# Patient Record
Sex: FEMALE | Race: WHITE | NOT HISPANIC OR LATINO | URBAN - METROPOLITAN AREA
[De-identification: names, ages, dates, MRNs, and addresses within clinical notes are randomized per-mention and may not be internally consistent; named-entity substitution may affect disease eponyms.]

---

## 2024-05-28 ENCOUNTER — TELEPHONE (OUTPATIENT)
Dept: PAIN MEDICINE | Facility: CLINIC | Age: 44
End: 2024-05-28
Payer: MEDICAID

## 2024-10-07 NOTE — TELEPHONE ENCOUNTER
----- Message from Danika Garza sent at 5/28/2024  9:50 AM CDT -----  States Dr Cayden Romero sent a referral. She is having lower back, leg and knee pain. She would like to schedule an appt. Please call pt [phone]. Thank you  
Spoke with pt regarding referral. Informed pt that Dr Briones is not in network with [employer]. Pt verbalized understanding.  
yes